# Patient Record
(demographics unavailable — no encounter records)

---

## 2020-08-07 NOTE — NUR
cm note

pt lives with significant other, independent with adls/ambulation. no dme. dc plan is back 
[place] home at [place]. no dc needs. 

-------------------------------------------------------------------------------

Addendum: 08/07/20 at 1821 by LOBO MANCUSO CM

-------------------------------------------------------------------------------

Amended: Links added.

## 2020-08-07 NOTE — NUR
CHART CHECK COMPLETED. 



Pt IS A 35 Y.O. MALE ADMITTED SECONDARY TO [facility]. Pt HAS A PAST MEDICAL HISTORY 
SIGNIFICANT FOR MORBID OBESITY, ARRHYTHMIA, HYPERTENSION, RECURRENT DIVERTICULITIS, 
UMBILICAL HERNIA, GALLSTONE. Pt CURRENTLY ON REGULAR TEXTURE,THIN LIQUID DIET (HEART 
HEALTHY). PLEASE REQUEST FORMAL SKILLED SPEECH/SWALLOW EVALUATION IF Pt PRESENTS WITH +S/S 
OF ASPIRATION SUCH AS COUGH RESPONSE, THROAT CLEAR, OR WET VOCAL QUALITY DURING P.O.

-------------------------------------------------------------------------------

Addendum: 08/07/20 at 1230 by ALEJANDRA NASH [facility]

-------------------------------------------------------------------------------

Amended: Links added.

## 2020-08-08 NOTE — NUR
PT D/C HOME



USING TEACH BACK TECHNIQUE RE; NEW MEDS HOME MEDS AND S/S TO WATCH FOR AND WHEN TO CALL MD 
.



FOLLOW UP WITH YOUR PRIMARY DOCTOR IN 2-4 DAYS.



FOLLOW UP WITH YOUR CARDIOLOGIST IN 1-2 WEEKS.



IF YOU EXPERIENCE ANY SHORTNESS OF BREATH OR CHEST PAIN THAT DOES NOT RESOLVE WITH REST CALL 
[phone].



CONTINUE ALL CURRENT HOME MEDICATIONS AS BEFORE.



[id number], DENIES ANY QUESTIONS, IV OUT INTACT, NO BLEEDING, TELE PACK D/C.